# Patient Record
Sex: MALE | Race: WHITE | URBAN - METROPOLITAN AREA
[De-identification: names, ages, dates, MRNs, and addresses within clinical notes are randomized per-mention and may not be internally consistent; named-entity substitution may affect disease eponyms.]

---

## 2022-11-09 ENCOUNTER — OFFICE VISIT (OUTPATIENT)
Dept: ORTHOPEDIC SURGERY | Age: 22
End: 2022-11-09
Payer: COMMERCIAL

## 2022-11-09 VITALS — HEART RATE: 74 BPM | DIASTOLIC BLOOD PRESSURE: 80 MMHG | OXYGEN SATURATION: 98 % | SYSTOLIC BLOOD PRESSURE: 130 MMHG

## 2022-11-09 DIAGNOSIS — S82.891A CLOSED FRACTURE OF RIGHT ANKLE, INITIAL ENCOUNTER: Primary | ICD-10-CM

## 2022-11-09 PROCEDURE — 29405 APPL SHORT LEG CAST: CPT | Performed by: STUDENT IN AN ORGANIZED HEALTH CARE EDUCATION/TRAINING PROGRAM

## 2022-11-09 PROCEDURE — 99203 OFFICE O/P NEW LOW 30 MIN: CPT | Performed by: STUDENT IN AN ORGANIZED HEALTH CARE EDUCATION/TRAINING PROGRAM

## 2022-11-09 NOTE — PROGRESS NOTES
Patient is here for consult on right ankle fracture -- patient states he jumped from a pillar under a bridge and landed on an angle. Patient has been wearing boot from ED.

## 2022-11-10 ASSESSMENT — ENCOUNTER SYMPTOMS
COUGH: 0
SHORTNESS OF BREATH: 0
VOICE CHANGE: 0
COLOR CHANGE: 0
SORE THROAT: 0
WHEEZING: 0
NAUSEA: 0
BACK PAIN: 0
VOMITING: 0

## 2022-11-10 NOTE — PROGRESS NOTES
11/9/2022   Chief Complaint   Patient presents with    Ankle Injury     Patient is here for an ankle injury from a fall        History of Present Illness:                             Jem Zarate is a 25 y.o. male referred by self for evaluation and treatment of right ankle pain. This is evaluated as a personal injury. 2 days prior, patient jumped off a large ledge, approximately 6 feet off the ground landing onto his right foot first, causing a immediate ankle injury. Patient does not member a specific position of the ankle during the injury but states he had immediate pain over the medial aspect. He was helped to his feet but had difficulty ambulating and was brought to the emergency room. X-rays were taken at the outside facility and he demonstrated nondisplaced medial malleolus fracture. He was placed in a walking boot and made nonweightbearing and given crutches. He is following up with me today for evaluation. He has been compliant with nonweightbearing and he has no new injuries or complaints. He is accompanied by his mother. He denies any prior right ankle injury. He has no advanced imaging to the right ankle. No other orthopedic complaints. This is my first time seeing the patient    The pain's location is medial aspect of right ankle around the medial malleolus posteriorly. he describes the symptoms as aching, sharp and stabbing. Symptoms improve with rest. Symptoms worsen with weightbearing, palpation, twisting activities. Patient states he denies having sensation of instability, decreased ROM    Treatment to date has been ice, pain medication, walking boot with mild  relief. Patient denies prior injury to ankle, denies numbness, tingling, fever, chills. Is affecting ADLs. Pain is 8/10 at it's worst.    Outside reports reviewed: none. Patient's medications, allergies, past medical, surgical, social and family histories were reviewed and updated as appropriate. Medical History  Patient's medications, allergies, past medical, surgical, social and family histories were reviewed and updated as appropriate. No past medical history on file. No past surgical history on file. No family history on file. Social History     Socioeconomic History    Marital status: Unknown     No current outpatient medications on file. No current facility-administered medications for this visit. No Known Allergies      Review of Systems   Constitutional:  Positive for activity change. Negative for fatigue and fever. HENT:  Negative for sneezing, sore throat and voice change. Respiratory:  Negative for cough, shortness of breath and wheezing. Cardiovascular:  Negative for leg swelling. Gastrointestinal:  Negative for nausea and vomiting. Musculoskeletal:  Positive for arthralgias and joint swelling. Negative for back pain, gait problem, myalgias, neck pain and neck stiffness. Skin:  Negative for color change, rash and wound. Neurological:  Negative for weakness and numbness. Psychiatric/Behavioral:  Negative for behavioral problems, confusion and self-injury. Examination:  General Exam:  Vitals: /80 (Site: Right Upper Arm, Position: Sitting)   Pulse 74   SpO2 98%    Physical Exam  Constitutional:       General: He is not in acute distress. Appearance: Normal appearance. He is normal weight. He is not ill-appearing. Eyes:      General:         Right eye: No discharge. Left eye: No discharge. Extraocular Movements: Extraocular movements intact. Cardiovascular:      Rate and Rhythm: Normal rate. Pulmonary:      Effort: Pulmonary effort is normal. No respiratory distress. Breath sounds: No wheezing. Musculoskeletal:         General: Swelling, tenderness and signs of injury present. No deformity.       Right knee: Normal.      Left knee: Normal.      Right lower leg: Normal. No edema. Left lower leg: Normal. No edema. Right ankle: Swelling present. No deformity, ecchymosis or lacerations. Tenderness present over the medial malleolus. No lateral malleolus tenderness. Decreased range of motion. Anterior drawer test negative. Normal pulse. Right Achilles Tendon: Normal.      Left ankle: Normal.      Left Achilles Tendon: Normal.      Right foot: Normal.      Left foot: Normal.   Skin:     General: Skin is warm and dry. Capillary Refill: Capillary refill takes less than 2 seconds. Findings: Bruising present. Neurological:      General: No focal deficit present. Mental Status: He is alert and oriented to person, place, and time. Sensory: No sensory deficit. Coordination: Coordination normal.      Gait: Gait normal.   Psychiatric:         Mood and Affect: Mood normal.         Behavior: Behavior normal.      RIGHT Foot and Ankle Exam      INSPECTION: ALIGNMENT:    Gait: Not tested     Alignment:     Hindfoot: Normal       Midfoot: Normal     Forefoot: Normal    Scars: None   Color: Minimal bruising over medial malleolus    Swelling: Moderate swelling diffusely, worse at medial malleolus    Atrophy: None Collective     Heel / Toe Walking: Not tested    Ankle-Hindfoot Alignment:    Good -plantigrade (PG), well aligned      TENDERNESS:    Sinus tarsi: None   Anteromedial joint line: Positive    Syndesmosis: none   Anterolateral joint line: none    ATFL: Negative   PTFL: Negative      CFL: Negative       Talonavicular: none     Anterior tibialis: none    Anterolateral gutter: none  Extensor tendons: none    Fibula: none    Peroneal tendons: none     Peroneal tubercle.  None     Medial/lateral achilles: none    Medial/lateral achilles insertion: none        Deltoid: none        Medial Malleolus: Positive  retrocalcaneal: none    PTT: none    Medial achilles: none    Navicular: none   Lateral achilles: none    Calcaneal tuberosity: none        Calcaneal cuboid: none  MT / MT heads: none     Navicular: none   Medial cord origin PF: none    Cuneiforms: none   Web space: none    Lisfranc none        Base of the fifth metatarsal: none         RANGE OF MOTION:  RIGHT   LEFT    STRENGTH: (Right) (* = pain)     Ankle DF/PF:    15/45   15/45    Anterior tibialis: 5/5    Eversion/Inversion:   15/25   15/25   Posterior tibialis: 5/5    Midfoot ABD/ADD:   10/10   10/10   Gastroc-soleus: 5/5    First MTP DF/PF:   60/25 60/25   Peroneals: 5/5    EHL: 5/5            FHL: 5/5        SPECIAL TESTS:    Anterior drawer:  Grade 1      (C-W contralateral side)     Inversion: 30°     Eversion 10°      Collective Instability: (Ant-post and varus-valgus)    Stable      PROVOCATIVE TESTING:    Forced DF/ER: No pain at syndesmosis. Mid-leg squeeze No pain at syndesmosis    Forced DF: No pain anterior joint line. Forced PF: No pain posterior ankle. Forced INV: Pain present medially    Forced EV: Mild pain medial     Duartes sign: Normal ankle plantar flexion. Resisted peroneal No subluxation or pain    1st-2nd MT toggle No pain at Lisfranc    MT-T torque No pain at Lisfranc      NEUROLOGIC TESTING:    All dermatomes foot, ankle and leg have normal sensation light touch    Ankle Reflexes 2+, symmetric     Negative Babinski and No Clonus      VASCULAR:  2+ pulses PT/DT with brisk capillary refill toes. Diagnostic testing:  X-ray images were reviewed by myself and discussed with the patient:  3 views of the right ankle in a skeletally mature patient demonstrates nondisplaced fracture vertically at the medial malleolus at the shoulder. Best seen on oblique view. No additional osseous or soft tissue abnormality seen. Ankle joint is well aligned and there is no significant tilt. No additional osseous abnormality such as additional fracture or OCD lesion. No sign of any soft tissue lesion such as soft tissue avulsion type injury. Mortise is symmetric throughout.     Office Procedures:  No orders of the defined types were placed in this encounter. Short leg cast placed without issue to right lower extremity    Assessment and Plan    A: Right ankle fracture    P:   I had a thorough conversation with the patient his mother regarding his imaging as well as his treatment course. I explained that this fracture is nondisplaced and should do well if patient is able to be compliant with restrictions. This will include being nonweightbearing of the right lower extremity and maintaining either a walking boot or cast.  I discussed the benefits and negatives of cast versus walking boot and patient would like to proceed with a cast at this time. I placed a short leg cast to the right lower extremity and discussed with him cast care as well as maintaining it dry and being nonweightbearing. Patient voices understanding. He will remain nonweightbearing and follow-up in 2 weeks for cast check as well as new x-rays in the cast to ensure that the fracture has maintained its position. All questions were answered and patient and his mother voiced understanding. He will continue to ice to the cast and use over-the-counter pain medication for pain control.     Electronically signed by Lore Palencia DO on 11/10/2022 at 11:41 AM

## 2022-11-23 ENCOUNTER — OFFICE VISIT (OUTPATIENT)
Dept: ORTHOPEDIC SURGERY | Age: 22
End: 2022-11-23
Payer: COMMERCIAL

## 2022-11-23 VITALS — HEART RATE: 74 BPM | DIASTOLIC BLOOD PRESSURE: 78 MMHG | SYSTOLIC BLOOD PRESSURE: 116 MMHG | OXYGEN SATURATION: 98 %

## 2022-11-23 DIAGNOSIS — S82.891D CLOSED FRACTURE OF RIGHT ANKLE WITH ROUTINE HEALING, SUBSEQUENT ENCOUNTER: Primary | ICD-10-CM

## 2022-11-23 PROBLEM — S82.891A CLOSED FRACTURE OF RIGHT ANKLE: Status: ACTIVE | Noted: 2022-11-23

## 2022-11-23 PROCEDURE — 99213 OFFICE O/P EST LOW 20 MIN: CPT | Performed by: STUDENT IN AN ORGANIZED HEALTH CARE EDUCATION/TRAINING PROGRAM

## 2022-11-23 ASSESSMENT — ENCOUNTER SYMPTOMS
BACK PAIN: 0
SORE THROAT: 0
VOMITING: 0
COLOR CHANGE: 0
SHORTNESS OF BREATH: 0
COUGH: 0
NAUSEA: 0
WHEEZING: 0
VOICE CHANGE: 0

## 2022-11-23 NOTE — PROGRESS NOTES
11/23/2022   Chief Complaint   Patient presents with    Follow-up     2 week follow up on right ankle fracture       Updated HPI: Patient is here for 2-week follow-up status post right ankle injury. Patient has no new complaints and states has been fully compliant with nonweightbearing and cast care. He has kept the cast clean and dry without issue. No new orthopedic complaints. No changes to his health history. No new injuries. Previous HPI (11/9/2022):                             Hardy Tucker is a 23 y.o. male referred by self for evaluation and treatment of right ankle pain. This is evaluated as a personal injury. 2 days prior, patient jumped off a large ledge, approximately 6 feet off the ground landing onto his right foot first, causing a immediate ankle injury. Patient does not member a specific position of the ankle during the injury but states he had immediate pain over the medial aspect. He was helped to his feet but had difficulty ambulating and was brought to the emergency room. X-rays were taken at the outside facility and he demonstrated nondisplaced medial malleolus fracture. He was placed in a walking boot and made nonweightbearing and given crutches. He is following up with me today for evaluation. He has been compliant with nonweightbearing and he has no new injuries or complaints. He is accompanied by his mother. He denies any prior right ankle injury. He has no advanced imaging to the right ankle. No other orthopedic complaints. This is my first time seeing the patient    The pain's location is medial aspect of right ankle around the medial malleolus posteriorly. he describes the symptoms as aching, sharp and stabbing. Symptoms improve with rest. Symptoms worsen with weightbearing, palpation, twisting activities. Patient states he denies having sensation of instability, decreased ROM    Treatment to date has been ice, pain medication, walking boot with mild  relief. Patient denies prior injury to ankle, denies numbness, tingling, fever, chills. Is affecting ADLs. Pain is 8/10 at it's worst.    Outside reports reviewed: none. Patient's medications, allergies, past medical, surgical, social and family histories were reviewed and updated as appropriate. Medical History  Patient's medications, allergies, past medical, surgical, social and family histories were reviewed and updated as appropriate. No past medical history on file. No past surgical history on file. No family history on file. Social History     Socioeconomic History    Marital status: Unknown     No current outpatient medications on file. No current facility-administered medications for this visit. No Known Allergies      Review of Systems   Constitutional:  Positive for activity change. Negative for fatigue and fever. HENT:  Negative for sneezing, sore throat and voice change. Respiratory:  Negative for cough, shortness of breath and wheezing. Cardiovascular:  Negative for leg swelling. Gastrointestinal:  Negative for nausea and vomiting. Musculoskeletal:  Positive for arthralgias. Negative for back pain, gait problem, joint swelling, myalgias, neck pain and neck stiffness. Skin:  Negative for color change, rash and wound. Neurological:  Negative for weakness and numbness. Psychiatric/Behavioral:  Negative for behavioral problems, confusion and self-injury. Examination:  General Exam:  Vitals: /78 (Site: Right Wrist, Position: Sitting)   Pulse 74   SpO2 98%    Physical Exam  Constitutional:       General: He is not in acute distress. Appearance: Normal appearance. He is normal weight. He is not ill-appearing. Eyes:      General:         Right eye: No discharge. Left eye: No discharge. Extraocular Movements: Extraocular movements intact. Cardiovascular:      Rate and Rhythm: Normal rate. Pulmonary:      Effort: Pulmonary effort is normal. No respiratory distress. Breath sounds: No wheezing. Musculoskeletal:         General: Swelling, tenderness and signs of injury present. No deformity. Right knee: Normal.      Left knee: Normal.      Right lower leg: Normal. No edema. Left lower leg: Normal. No edema. Right ankle: Swelling present. No deformity, ecchymosis or lacerations. Tenderness present over the medial malleolus. No lateral malleolus tenderness. Decreased range of motion. Anterior drawer test negative. Normal pulse. Right Achilles Tendon: Normal.      Left ankle: Normal.      Left Achilles Tendon: Normal.      Right foot: Normal.      Left foot: Normal.   Skin:     General: Skin is warm and dry. Capillary Refill: Capillary refill takes less than 2 seconds. Findings: Bruising present. Neurological:      General: No focal deficit present. Mental Status: He is alert and oriented to person, place, and time. Sensory: No sensory deficit. Coordination: Coordination normal.      Gait: Gait normal.   Psychiatric:         Mood and Affect: Mood normal.         Behavior: Behavior normal.      RIGHT Foot and Ankle Exam    Exam limited due to overlying cast    INSPECTION: ALIGNMENT:    Gait: Not tested     Alignment:     Hindfoot: Normal       Midfoot: Normal     Forefoot: Normal    Scars: None   Color: Minimal bruising over medial malleolus    Swelling: Minimal swelling at toes    Atrophy: None Collective     Heel / Toe Walking: Not tested    Ankle-Hindfoot Alignment:    Good -plantigrade (PG), well aligned        NEUROLOGIC TESTING:    All dermatomes foot, ankle and leg have normal sensation light touch        VASCULAR:  2+ pulses PT/DT with brisk capillary refill toes.      Diagnostic testing:  X-ray images were reviewed by myself and discussed with the patient:  3 views of the right ankle in a skeletally immature patient demonstrates previously seen fracture at the shoulder of the medial malleolus that is nondisplaced and no change in alignment compared to previous imaging. Overlying cast does somewhat skew the view. No additional osseous or soft tissue abnormalities noted. No OCD lesions. Previous imaging:  3 views of the right ankle in a skeletally mature patient demonstrates nondisplaced fracture vertically at the medial malleolus at the shoulder. Best seen on oblique view. No additional osseous or soft tissue abnormality seen. Ankle joint is well aligned and there is no significant tilt. No additional osseous abnormality such as additional fracture or OCD lesion. No sign of any soft tissue lesion such as soft tissue avulsion type injury. Mortise is symmetric throughout. Office Procedures:  No orders of the defined types were placed in this encounter. Assessment and Plan    A: Right ankle fracture    P:   I again had a thorough conversation with the patient regarding his imaging as well as his treatment course. I explained that the fracture remains well positioned and at this time we can continue nonoperative care. We will continue his restrictions of nonweightbearing of the right lower extremity in cast care. He will follow-up in 4 weeks for reevaluation and new x-rays out of the cast.  If fracture remains well at that time, we will place him in a walking boot which she will bring with him that he has from before. We will begin progressing his weightbearing at that time. He can continue using ice and over-the-counter pain medication for pain control. All questions were answered and patient voices understanding.     Electronically signed by Vidya Urbano DO on 11/23/2022 at 10:10 AM

## 2022-12-21 ENCOUNTER — OFFICE VISIT (OUTPATIENT)
Dept: ORTHOPEDIC SURGERY | Age: 22
End: 2022-12-21
Payer: COMMERCIAL

## 2022-12-21 VITALS — OXYGEN SATURATION: 98 % | HEART RATE: 74 BPM | DIASTOLIC BLOOD PRESSURE: 72 MMHG | SYSTOLIC BLOOD PRESSURE: 109 MMHG

## 2022-12-21 DIAGNOSIS — S82.891D CLOSED FRACTURE OF RIGHT ANKLE WITH ROUTINE HEALING, SUBSEQUENT ENCOUNTER: Primary | ICD-10-CM

## 2022-12-21 PROCEDURE — 99213 OFFICE O/P EST LOW 20 MIN: CPT | Performed by: STUDENT IN AN ORGANIZED HEALTH CARE EDUCATION/TRAINING PROGRAM

## 2022-12-21 ASSESSMENT — ENCOUNTER SYMPTOMS
COUGH: 0
NAUSEA: 0
WHEEZING: 0
SHORTNESS OF BREATH: 0
VOMITING: 0
COLOR CHANGE: 0
BACK PAIN: 0
SORE THROAT: 0
VOICE CHANGE: 0

## 2022-12-21 NOTE — PROGRESS NOTES
12/21/2022   Chief Complaint   Patient presents with    Follow-up     Right ankle fracture      Updated HPI: Patient is here for reevaluation of his right ankle injury. Patient has been extremely compliant with his restrictions and has no new injuries or complaints. He denies any changes in his health history    Previous HPI (11/23/2022): Patient is here for 2-week follow-up status post right ankle injury. Patient has no new complaints and states has been fully compliant with nonweightbearing and cast care. He has kept the cast clean and dry without issue. No new orthopedic complaints. No changes to his health history. No new injuries. Previous HPI (11/9/2022):                             Jhoan Turner is a 23 y.o. male referred by self for evaluation and treatment of right ankle pain. This is evaluated as a personal injury. 2 days prior, patient jumped off a large ledge, approximately 6 feet off the ground landing onto his right foot first, causing a immediate ankle injury. Patient does not member a specific position of the ankle during the injury but states he had immediate pain over the medial aspect. He was helped to his feet but had difficulty ambulating and was brought to the emergency room. X-rays were taken at the outside facility and he demonstrated nondisplaced medial malleolus fracture. He was placed in a walking boot and made nonweightbearing and given crutches. He is following up with me today for evaluation. He has been compliant with nonweightbearing and he has no new injuries or complaints. He is accompanied by his mother. He denies any prior right ankle injury. He has no advanced imaging to the right ankle. No other orthopedic complaints. This is my first time seeing the patient    The pain's location is medial aspect of right ankle around the medial malleolus posteriorly. he describes the symptoms as aching, sharp and stabbing.  Symptoms improve with rest. Symptoms worsen with weightbearing, palpation, twisting activities. Patient states he denies having sensation of instability, decreased ROM    Treatment to date has been ice, pain medication, walking boot with mild  relief. Patient denies prior injury to ankle, denies numbness, tingling, fever, chills. Is affecting ADLs. Pain is 8/10 at it's worst.    Outside reports reviewed: none. Patient's medications, allergies, past medical, surgical, social and family histories were reviewed and updated as appropriate. Medical History  Patient's medications, allergies, past medical, surgical, social and family histories were reviewed and updated as appropriate. No past medical history on file. No past surgical history on file. No family history on file. Social History     Socioeconomic History    Marital status: Unknown     No current outpatient medications on file. No current facility-administered medications for this visit. No Known Allergies      Review of Systems   Constitutional:  Positive for activity change. Negative for fatigue and fever. HENT:  Negative for sneezing, sore throat and voice change. Respiratory:  Negative for cough, shortness of breath and wheezing. Cardiovascular:  Negative for leg swelling. Gastrointestinal:  Negative for nausea and vomiting. Musculoskeletal:  Negative for arthralgias, back pain, gait problem, joint swelling, myalgias, neck pain and neck stiffness. Skin:  Negative for color change, rash and wound. Neurological:  Negative for weakness and numbness. Psychiatric/Behavioral:  Negative for behavioral problems, confusion and self-injury. Examination:  General Exam:  Vitals: There were no vitals taken for this visit. Physical Exam  Constitutional:       General: He is not in acute distress. Appearance: Normal appearance. He is normal weight. He is not ill-appearing.    Eyes:      General: Right eye: No discharge. Left eye: No discharge. Extraocular Movements: Extraocular movements intact. Cardiovascular:      Rate and Rhythm: Normal rate. Pulmonary:      Effort: Pulmonary effort is normal. No respiratory distress. Breath sounds: No wheezing. Musculoskeletal:         General: No swelling, tenderness, deformity or signs of injury. Right knee: Normal.      Left knee: Normal.      Right lower leg: Normal. No edema. Left lower leg: Normal. No edema. Right ankle: No swelling, deformity, ecchymosis or lacerations. No lateral malleolus tenderness. Decreased range of motion. Anterior drawer test negative. Normal pulse. Right Achilles Tendon: Normal.      Left ankle: Normal.      Left Achilles Tendon: Normal.      Right foot: Normal.      Left foot: Normal.   Skin:     General: Skin is warm and dry. Capillary Refill: Capillary refill takes less than 2 seconds. Findings: No bruising. Neurological:      General: No focal deficit present. Mental Status: He is alert and oriented to person, place, and time. Sensory: No sensory deficit. Coordination: Coordination normal.      Gait: Gait normal.   Psychiatric:         Mood and Affect: Mood normal.         Behavior: Behavior normal.      RIGHT Foot and Ankle Exam    Cast removed for examination    INSPECTION: ALIGNMENT:    Gait: Mild limp     Alignment:     Hindfoot: Normal       Midfoot: Normal     Forefoot: Normal    Scars: None   Color: Normal    Swelling: Minimal swelling at toes    Atrophy: None Collective     Heel / Toe Walking: Not tested    Ankle-Hindfoot Alignment:    Good -plantigrade (PG), well aligned    Diffuse weakness, stiffness throughout ankle with testing.   No concerning findings as this is likely related to his prolonged period in the cast.      NEUROLOGIC TESTING:    All dermatomes foot, ankle and leg have normal sensation light touch        VASCULAR:  2+ pulses PT/DT with brisk capillary refill toes. Diagnostic testing:  X-ray images were reviewed by myself and discussed with the patient:  3 views of the right ankle in a skeletally mature patient demonstrates no acute osseous abnormalities. No longer able to visualize previous fracture site of the medial malleolus. No additional osseous or soft tissue abnormalities including type of OCD lesion or additional fracture. No callus formation seen. No soft tissue calcification or avulsion type injury noted. Alignment is appropriate throughout. Previous imaging:  3 views of the right ankle in a skeletally mature patient demonstrates previously seen fracture at the shoulder of the medial malleolus that is nondisplaced and no change in alignment compared to previous imaging. Overlying cast does somewhat skew the view. No additional osseous or soft tissue abnormalities noted. No OCD lesions. 3 views of the right ankle in a skeletally mature patient demonstrates nondisplaced fracture vertically at the medial malleolus at the shoulder. Best seen on oblique view. No additional osseous or soft tissue abnormality seen. Ankle joint is well aligned and there is no significant tilt. No additional osseous abnormality such as additional fracture or OCD lesion. No sign of any soft tissue lesion such as soft tissue avulsion type injury. Mortise is symmetric throughout. Office Procedures:  No orders of the defined types were placed in this encounter. Right lower extremity cast removed without issue. Assessment and Plan    A: Right ankle fracture    P:   I again had a thorough conversation with the patient and his mother regarding his ankle injury as well as treatment course. I explained at this time there is no visualization of the fracture and is healed well. We will ease him back into activity and he will be weight-bear as tolerated in the walking boot with use of crutches to limit his weightbearing.   He will be 25% weightbearing for 2 days and then will attempt to increase 25% increments 2 days at a time to work towards 100% weightbearing in the walking boot and off the crutches. He should build accomplish this within a week. If he is unable to do so within 2 weeks he should return to my office for reevaluation new x-rays. Otherwise, patient will follow-up in 4 weeks with new x-rays and at that time we will take him out of the walking boot completely. He will do the home exercise program provided for him today to work on ankle range of motion and strengthening. He will avoid any type of high-impact activity or exercise. He will continue ice and over-the-counter pain medications for pain control. All questions were answered and patient voices understanding.     Electronically signed by Estefany Newby DO on 12/21/2022 at 10:23 AM

## 2022-12-21 NOTE — PROGRESS NOTES
Patient comes in today for a 6 week follow up post right ankle fx on 11/7/22. He is non weightbearing in cast. He rates his pain at 0/10 today. Cast was removed and x-rays were taken.

## 2023-01-23 ENCOUNTER — TELEPHONE (OUTPATIENT)
Dept: ORTHOPEDIC SURGERY | Age: 23
End: 2023-01-23

## 2023-01-23 NOTE — TELEPHONE ENCOUNTER
Called Patient about missed appointment for follow up on ankle fracture. Patient did not answer and mailbox was full. No VM left.      Ph. [phone]

## 2023-04-27 ENCOUNTER — OFFICE VISIT (OUTPATIENT)
Dept: FAMILY MEDICINE CLINIC | Age: 23
End: 2023-04-27
Payer: COMMERCIAL

## 2023-04-27 VITALS
WEIGHT: 116.6 LBS | SYSTOLIC BLOOD PRESSURE: 120 MMHG | TEMPERATURE: 97.9 F | HEART RATE: 82 BPM | OXYGEN SATURATION: 98 % | DIASTOLIC BLOOD PRESSURE: 80 MMHG

## 2023-04-27 DIAGNOSIS — R63.0 DECREASED APPETITE: ICD-10-CM

## 2023-04-27 DIAGNOSIS — R09.89 GLOBUS SENSATION: Primary | ICD-10-CM

## 2023-04-27 DIAGNOSIS — R09.89 GLOBUS SENSATION: ICD-10-CM

## 2023-04-27 LAB
ALBUMIN SERPL-MCNC: 5.3 G/DL (ref 3.4–5)
ALBUMIN/GLOB SERPL: 2 {RATIO} (ref 1.1–2.2)
ALP SERPL-CCNC: 48 U/L (ref 40–129)
ALT SERPL-CCNC: 10 U/L (ref 10–40)
ANION GAP SERPL CALCULATED.3IONS-SCNC: 13 MMOL/L (ref 3–16)
AST SERPL-CCNC: 11 U/L (ref 15–37)
BASOPHILS # BLD: 0.1 K/UL (ref 0–0.2)
BASOPHILS NFR BLD: 1 %
BILIRUB SERPL-MCNC: 1.5 MG/DL (ref 0–1)
BUN SERPL-MCNC: 9 MG/DL (ref 7–20)
CALCIUM SERPL-MCNC: 10.3 MG/DL (ref 8.3–10.6)
CHLORIDE SERPL-SCNC: 102 MMOL/L (ref 99–110)
CO2 SERPL-SCNC: 25 MMOL/L (ref 21–32)
CREAT SERPL-MCNC: 0.7 MG/DL (ref 0.9–1.3)
DEPRECATED RDW RBC AUTO: 12.3 % (ref 12.4–15.4)
EOSINOPHIL # BLD: 0 K/UL (ref 0–0.6)
EOSINOPHIL NFR BLD: 0.5 %
GFR SERPLBLD CREATININE-BSD FMLA CKD-EPI: >60 ML/MIN/{1.73_M2}
GLUCOSE SERPL-MCNC: 87 MG/DL (ref 70–99)
HCT VFR BLD AUTO: 46.3 % (ref 40.5–52.5)
HGB BLD-MCNC: 16.2 G/DL (ref 13.5–17.5)
LYMPHOCYTES # BLD: 1.7 K/UL (ref 1–5.1)
LYMPHOCYTES NFR BLD: 19.7 %
MCH RBC QN AUTO: 31.7 PG (ref 26–34)
MCHC RBC AUTO-ENTMCNC: 35 G/DL (ref 31–36)
MCV RBC AUTO: 90.6 FL (ref 80–100)
MONOCYTES # BLD: 0.7 K/UL (ref 0–1.3)
MONOCYTES NFR BLD: 7.8 %
NEUTROPHILS # BLD: 6.1 K/UL (ref 1.7–7.7)
NEUTROPHILS NFR BLD: 71 %
PLATELET # BLD AUTO: 322 K/UL (ref 135–450)
PMV BLD AUTO: 8.8 FL (ref 5–10.5)
POTASSIUM SERPL-SCNC: 4.7 MMOL/L (ref 3.5–5.1)
PROT SERPL-MCNC: 7.9 G/DL (ref 6.4–8.2)
RBC # BLD AUTO: 5.11 M/UL (ref 4.2–5.9)
SODIUM SERPL-SCNC: 140 MMOL/L (ref 136–145)
WBC # BLD AUTO: 8.6 K/UL (ref 4–11)

## 2023-04-27 PROCEDURE — 99203 OFFICE O/P NEW LOW 30 MIN: CPT | Performed by: NURSE PRACTITIONER

## 2023-04-27 ASSESSMENT — ENCOUNTER SYMPTOMS
TROUBLE SWALLOWING: 0
WHEEZING: 0
DIARRHEA: 0
COUGH: 0
VOMITING: 0
SHORTNESS OF BREATH: 0
SORE THROAT: 0
SINUS PAIN: 0
CHEST TIGHTNESS: 0
SINUS PRESSURE: 0
NAUSEA: 0
ABDOMINAL PAIN: 0
RHINORRHEA: 0

## 2023-04-27 NOTE — PROGRESS NOTES
Tyra Torres   23 y.o.  male  [id number]      Chief Complaint   Patient presents with    Other     Pt states it feels like there is a ball caught in his throat sometimes and he has decreased appetite        Subjective:  22 y.o.male is here for a follow up. He has the following chronic/acute medical problems:  Patient Active Problem List   Diagnosis    Closed fracture of right ankle       Patient is here with complaints of when he swallows he has a sensation of a lump - states this is not all the time - states this started last week. Patient states he has decreased appetite over the last week - denies nausea/vomiting. Review of Systems   Constitutional:  Positive for appetite change (decreased). Negative for chills, fatigue and fever. HENT:  Negative for congestion, ear pain, postnasal drip, rhinorrhea, sinus pressure, sinus pain, sneezing, sore throat and trouble swallowing. Respiratory:  Negative for cough, chest tightness, shortness of breath and wheezing. Cardiovascular:  Negative for chest pain and palpitations. Gastrointestinal:  Negative for abdominal pain, diarrhea, nausea and vomiting. Skin:  Negative for rash. Neurological:  Negative for dizziness, light-headedness and headaches. Psychiatric/Behavioral:  The patient is nervous/anxious (states since this sensation of lump in throat). No current outpatient medications on file. No current facility-administered medications for this visit. Past medical, family,surgical history reviewed today. Objective:  /80   Pulse 82   Temp 97.9 °F (36.6 °C) (Oral)   Wt 116 lb 9.6 oz (52.9 kg)   SpO2 98%   BP Readings from Last 3 Encounters:   04/27/23 120/80   12/21/22 109/72   11/23/22 116/78     Wt Readings from Last 3 Encounters:   04/27/23 116 lb 9.6 oz (52.9 kg)         Physical Exam  Constitutional:       Appearance: Normal appearance. HENT:      Head: Normocephalic. Mouth/Throat:      Lips: Pink.       Mouth:

## 2023-04-28 LAB
T4 FREE SERPL-MCNC: 1.7 NG/DL (ref 0.9–1.8)
TSH SERPL DL<=0.005 MIU/L-ACNC: 1.19 UIU/ML (ref 0.27–4.2)

## 2023-05-01 DIAGNOSIS — R17 TOTAL BILIRUBIN, ELEVATED: Primary | ICD-10-CM

## 2023-05-08 ENCOUNTER — HOSPITAL ENCOUNTER (OUTPATIENT)
Age: 23
Discharge: HOME | End: 2023-05-08
Payer: COMMERCIAL

## 2023-05-08 ENCOUNTER — HOSPITAL ENCOUNTER (OUTPATIENT)
Dept: ULTRASOUND IMAGING | Age: 23
Discharge: HOME | End: 2023-05-08
Payer: COMMERCIAL

## 2023-05-08 DIAGNOSIS — R17 TOTAL BILIRUBIN, ELEVATED: ICD-10-CM

## 2023-05-08 LAB
ALBUMIN SERPL-MCNC: 4.8 GM/DL (ref 3.4–5)
ALP BLD-CCNC: 46 IU/L (ref 40–129)
ALT SERPL-CCNC: 9 U/L (ref 10–40)
AST SERPL-CCNC: 14 IU/L (ref 15–37)
BILIRUB SERPL-MCNC: 1.1 MG/DL (ref 0–1)
BILIRUBIN DIRECT: 0.2 MG/DL (ref 0–0.3)
BILIRUBIN, INDIRECT: 0.9 MG/DL (ref 0–0.7)
TOTAL PROTEIN: 7.2 GM/DL (ref 6.4–8.2)

## 2023-05-08 PROCEDURE — 80076 HEPATIC FUNCTION PANEL: CPT

## 2023-05-08 PROCEDURE — 36415 COLL VENOUS BLD VENIPUNCTURE: CPT

## 2023-05-08 PROCEDURE — 76705 ECHO EXAM OF ABDOMEN: CPT
